# Patient Record
Sex: FEMALE | URBAN - METROPOLITAN AREA
[De-identification: names, ages, dates, MRNs, and addresses within clinical notes are randomized per-mention and may not be internally consistent; named-entity substitution may affect disease eponyms.]

---

## 2020-10-05 ENCOUNTER — PROCEDURE (OUTPATIENT)
Dept: URBAN - METROPOLITAN AREA CLINIC 86 | Facility: CLINIC | Age: 80
End: 2020-10-05
Payer: COMMERCIAL

## 2020-10-05 PROCEDURE — 67028 INJECTION EYE DRUG: CPT | Performed by: OPHTHALMOLOGY

## 2020-10-05 ASSESSMENT — INTRAOCULAR PRESSURE
OD: 12
OS: 12

## 2020-11-16 ENCOUNTER — OFFICE VISIT (OUTPATIENT)
Dept: URBAN - METROPOLITAN AREA CLINIC 86 | Facility: CLINIC | Age: 80
End: 2020-11-16
Payer: COMMERCIAL

## 2020-11-16 PROCEDURE — 92014 COMPRE OPH EXAM EST PT 1/>: CPT | Performed by: OPHTHALMOLOGY

## 2020-11-16 PROCEDURE — 67028 INJECTION EYE DRUG: CPT | Performed by: OPHTHALMOLOGY

## 2020-11-16 PROCEDURE — 92134 CPTRZ OPH DX IMG PST SGM RTA: CPT | Performed by: OPHTHALMOLOGY

## 2020-11-16 ASSESSMENT — INTRAOCULAR PRESSURE
OD: 13
OS: 13

## 2020-11-16 NOTE — IMPRESSION/PLAN
Impression: Exudative age-related macular degeneration, right eye, with active choroidal neovascularization: H35.3211.
-s/p Avastin OD 10/5/20 OCT:
OD: atrophy OS: PED, scarring Plan: Doing well. Good suppression of CNVM with Avastin Rec Avastin OD

RTC 6 weeks Avastin OD #2/3

## 2020-11-16 NOTE — IMPRESSION/PLAN
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 Plan: The patient has a disciform scar. 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 The patient understands that further treatment would only be indicated to prevent enlargement of the central scotoma. I recommend the patient consider low vision aids, and we discussed the importance of yearly dilated eye exams.

## 2020-12-28 ENCOUNTER — PROCEDURE (OUTPATIENT)
Dept: URBAN - METROPOLITAN AREA CLINIC 86 | Facility: CLINIC | Age: 80
End: 2020-12-28
Payer: COMMERCIAL

## 2020-12-28 PROCEDURE — 67028 INJECTION EYE DRUG: CPT | Performed by: OPHTHALMOLOGY

## 2020-12-28 ASSESSMENT — INTRAOCULAR PRESSURE
OS: 14
OD: 13

## 2021-02-08 ENCOUNTER — PROCEDURE (OUTPATIENT)
Dept: URBAN - METROPOLITAN AREA CLINIC 86 | Facility: CLINIC | Age: 81
End: 2021-02-08
Payer: COMMERCIAL

## 2021-02-08 PROCEDURE — 67028 INJECTION EYE DRUG: CPT | Performed by: OPHTHALMOLOGY

## 2021-02-08 ASSESSMENT — INTRAOCULAR PRESSURE
OD: 15
OS: 17

## 2021-04-19 ENCOUNTER — OFFICE VISIT (OUTPATIENT)
Dept: URBAN - METROPOLITAN AREA CLINIC 86 | Facility: CLINIC | Age: 81
End: 2021-04-19
Payer: COMMERCIAL

## 2021-04-19 PROCEDURE — 67028 INJECTION EYE DRUG: CPT | Performed by: OPHTHALMOLOGY

## 2021-04-19 PROCEDURE — 92134 CPTRZ OPH DX IMG PST SGM RTA: CPT | Performed by: OPHTHALMOLOGY

## 2021-04-19 PROCEDURE — 99214 OFFICE O/P EST MOD 30 MIN: CPT | Performed by: OPHTHALMOLOGY

## 2021-04-19 ASSESSMENT — INTRAOCULAR PRESSURE
OS: 18
OD: 18

## 2021-04-19 NOTE — IMPRESSION/PLAN
Impression: Exudative age-related macular degeneration, right eye, with active choroidal neovascularization: H35.3211.
-s/p Avastin OD 02/08/821 OCT:
OD: atrophy OS: PED, scarring Plan:  The diagnosis, natural history, and prognosis of wet AMD, as well as the risks and benefits of various treatment options including laser, PDT, Avastin, Lucentis and Eylea; along with the alternatives of observation or participation in a clinical trial, were discussed at length. The patient understands that treatment may not improve vision, but should reduce the risk of further visual loss. The patient understands that smoking is the most significant modifiable risk factor for the development of advanced AMD, and also understands that if they do smoke (or have history of smoking in the past 5 years), they cannot take vitamin A/beta-carotene, since it may increase their risk for lung cancer. Given stability of vision and exam, pt elects to proceed with Avastin OD

RTC 6 weeks Avastin OD #2/3

## 2021-04-19 NOTE — IMPRESSION/PLAN
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 Plan: The patient has a disciform scar. 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 The patient understands that further treatment would only be indicated to prevent enlargement of the central scotoma. I recommend the patient consider low vision aids, and we discussed the importance of yearly dilated eye exams.

## 2021-06-28 ENCOUNTER — PROCEDURE (OUTPATIENT)
Dept: URBAN - METROPOLITAN AREA CLINIC 86 | Facility: CLINIC | Age: 81
End: 2021-06-28
Payer: COMMERCIAL

## 2021-06-28 DIAGNOSIS — H35.3211 EXUDATIVE AGE-RELATED MACULAR DEGENERATION, RIGHT EYE, WITH ACTIVE CHOROIDAL NEOVASCULARIZATION: Primary | ICD-10-CM

## 2021-06-28 PROCEDURE — 67028 INJECTION EYE DRUG: CPT | Performed by: OPHTHALMOLOGY

## 2021-06-28 ASSESSMENT — INTRAOCULAR PRESSURE
OD: 14
OS: 14

## 2021-08-09 ENCOUNTER — PROCEDURE (OUTPATIENT)
Dept: URBAN - METROPOLITAN AREA CLINIC 86 | Facility: CLINIC | Age: 81
End: 2021-08-09
Payer: COMMERCIAL

## 2021-08-09 PROCEDURE — 67028 INJECTION EYE DRUG: CPT | Performed by: OPHTHALMOLOGY

## 2021-08-09 ASSESSMENT — INTRAOCULAR PRESSURE
OS: 11
OD: 12

## 2021-10-18 ENCOUNTER — OFFICE VISIT (OUTPATIENT)
Dept: URBAN - METROPOLITAN AREA CLINIC 86 | Facility: CLINIC | Age: 81
End: 2021-10-18
Payer: COMMERCIAL

## 2021-10-18 DIAGNOSIS — H35.3223 EXUDATIVE AGE-REL MCLR DEGN, LEFT EYE, WITH INACTIVE SCAR: ICD-10-CM

## 2021-10-18 PROCEDURE — 92134 CPTRZ OPH DX IMG PST SGM RTA: CPT | Performed by: OPHTHALMOLOGY

## 2021-10-18 PROCEDURE — 67028 INJECTION EYE DRUG: CPT | Performed by: OPHTHALMOLOGY

## 2021-10-18 PROCEDURE — 99214 OFFICE O/P EST MOD 30 MIN: CPT | Performed by: OPHTHALMOLOGY

## 2021-10-18 ASSESSMENT — INTRAOCULAR PRESSURE
OD: 9
OS: 9

## 2021-10-18 NOTE — IMPRESSION/PLAN
Impression: Exudative age-rel mclr degn, left eye, with inactive scar: Q38.0409. Plan: The patient has a disciform scar. 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 The patient understands that further treatment would only be indicated to prevent enlargement of the central scotoma. I recommend the patient consider low vision aids, and we discussed the importance of yearly dilated eye exams.

## 2022-01-14 ENCOUNTER — OFFICE VISIT (OUTPATIENT)
Dept: URBAN - METROPOLITAN AREA CLINIC 86 | Facility: CLINIC | Age: 82
End: 2022-01-14
Payer: COMMERCIAL

## 2022-01-14 PROCEDURE — 92134 CPTRZ OPH DX IMG PST SGM RTA: CPT | Performed by: OPHTHALMOLOGY

## 2022-01-14 PROCEDURE — 67028 INJECTION EYE DRUG: CPT | Performed by: OPHTHALMOLOGY

## 2022-01-14 PROCEDURE — 99214 OFFICE O/P EST MOD 30 MIN: CPT | Performed by: OPHTHALMOLOGY

## 2022-01-14 ASSESSMENT — INTRAOCULAR PRESSURE
OD: 14
OS: 14

## 2022-01-14 NOTE — IMPRESSION/PLAN
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 Plan: The patient has a disciform scar. 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 The patient understands that further treatment would only be indicated to prevent enlargement of the central scotoma. I recommend the patient consider low vision aids, and we discussed the importance of yearly dilated eye exams.

## 2022-01-14 NOTE — IMPRESSION/PLAN
Impression: Exudative age-related macular degeneration, right eye, with active choroidal neovascularization: H35.3211.
-s/p Avastin OD 08/09/21 OCT: 01/14/22 OD: atrophy OS: PED, scarring Plan: The diagnosis, natural history, and prognosis of wet AMD, as well as the risks and benefits of various treatment options including laser, PDT, Avastin, Lucentis and Eylea; along with the alternatives of observation or participation in a clinical trial, were discussed at length. The patient understands that treatment may not improve vision, but should reduce the risk of further visual loss. The patient understands that smoking is the most significant modifiable risk factor for the development of advanced AMD, and also understands that if they do smoke (or have history of smoking in the past 5 years), they cannot take vitamin A/beta-carotene, since it may increase their risk for lung cancer. 

Given stability of vision and exam, pt elects to proceed with Avastin OD

RTC 6 weeks Avastin OD #2/3

## 2022-06-27 ENCOUNTER — PROCEDURE (OUTPATIENT)
Dept: URBAN - METROPOLITAN AREA CLINIC 86 | Facility: CLINIC | Age: 82
End: 2022-06-27
Payer: COMMERCIAL

## 2022-06-27 DIAGNOSIS — H35.3211 EXUDATIVE AGE-RELATED MACULAR DEGENERATION, RIGHT EYE, WITH ACTIVE CHOROIDAL NEOVASCULARIZATION: Primary | ICD-10-CM

## 2022-06-27 PROCEDURE — 67028 INJECTION EYE DRUG: CPT | Performed by: OPHTHALMOLOGY

## 2022-06-27 ASSESSMENT — INTRAOCULAR PRESSURE
OS: 10
OD: 11